# Patient Record
Sex: MALE | Race: WHITE
[De-identification: names, ages, dates, MRNs, and addresses within clinical notes are randomized per-mention and may not be internally consistent; named-entity substitution may affect disease eponyms.]

---

## 2022-06-27 ENCOUNTER — APPOINTMENT (OUTPATIENT)
Dept: FAMILY MEDICINE | Facility: CLINIC | Age: 22
End: 2022-06-27

## 2022-11-28 ENCOUNTER — APPOINTMENT (OUTPATIENT)
Dept: FAMILY MEDICINE | Facility: CLINIC | Age: 22
End: 2022-11-28

## 2022-11-28 VITALS
DIASTOLIC BLOOD PRESSURE: 78 MMHG | BODY MASS INDEX: 22.93 KG/M2 | SYSTOLIC BLOOD PRESSURE: 122 MMHG | HEIGHT: 73 IN | HEART RATE: 64 BPM | OXYGEN SATURATION: 98 % | WEIGHT: 173 LBS | TEMPERATURE: 97.4 F

## 2022-11-28 DIAGNOSIS — Z91.09 OTHER ALLERGY STATUS, OTHER THAN TO DRUGS AND BIOLOGICAL SUBSTANCES: ICD-10-CM

## 2022-11-28 DIAGNOSIS — Z80.0 FAMILY HISTORY OF MALIGNANT NEOPLASM OF DIGESTIVE ORGANS: ICD-10-CM

## 2022-11-28 DIAGNOSIS — Z23 ENCOUNTER FOR IMMUNIZATION: ICD-10-CM

## 2022-11-28 DIAGNOSIS — Z78.9 OTHER SPECIFIED HEALTH STATUS: ICD-10-CM

## 2022-11-28 DIAGNOSIS — Z83.3 FAMILY HISTORY OF DIABETES MELLITUS: ICD-10-CM

## 2022-11-28 DIAGNOSIS — Z82.0 FAMILY HISTORY OF EPILEPSY AND OTHER DISEASES OF THE NERVOUS SYSTEM: ICD-10-CM

## 2022-11-28 DIAGNOSIS — Z80.8 FAMILY HISTORY OF MALIGNANT NEOPLASM OF OTHER ORGANS OR SYSTEMS: ICD-10-CM

## 2022-11-28 PROCEDURE — 90471 IMMUNIZATION ADMIN: CPT

## 2022-11-28 PROCEDURE — 36415 COLL VENOUS BLD VENIPUNCTURE: CPT

## 2022-11-28 PROCEDURE — 99385 PREV VISIT NEW AGE 18-39: CPT | Mod: 25

## 2022-11-28 PROCEDURE — 90649 4VHPV VACCINE 3 DOSE IM: CPT

## 2022-11-28 RX ORDER — ALBUTEROL SULFATE 90 UG/1
108 (90 BASE) INHALANT RESPIRATORY (INHALATION)
Qty: 7 | Refills: 0 | Status: ACTIVE | COMMUNITY
Start: 2022-11-02

## 2022-11-28 RX ORDER — CETIRIZINE HCL 10 MG
10 TABLET ORAL
Refills: 0 | Status: ACTIVE | COMMUNITY

## 2022-11-28 NOTE — ASSESSMENT
[FreeTextEntry1] : Patient is a 21yo male presenting to the office for CPE.  Pt is new to our practice.\par \par Health Maintenance\par - Due for HPV, dose #1 given today.  R/B/SE/A discussed.\par - Due for flu shot, pt declines today.\par - Due for skin check, dermatology referral provided.\par - Unsure of Tdap status, tasked team to obtain immunization record from previous provider, Dr. Phipps.\par - Fasting labs drawn in office.\par - Eat plenty of fruits and vegetables, especially deeply colored fruits/vegetables (such as leafy greens, peaches) that are more nutrient-dense.  Continue to work hard on diet and exercise, limiting/avoiding saturated fat, fatty foods, greasy foods, red meats, white flour-based carbohydrates (cookies, cakes, white bread, white rice), and added sugars.  Chose whole grain foods and products made with whole grains over refined grains and white flour-based carbohydrates.  Avoid beverages and food with added sugar.  Limit salt intake to improve blood pressure.  Limit alcohol intake.\par - Try and incorporate 30 minutes of aerobic exercise to your daily routine.\par \par Call the office or go to the ED immediately if you develop new, worsening or concerning symptoms including high fever, severe headache/worst headache of your life, confusion, dizziness/lightheadedness, loss of consciousness, severe chest pain, difficulty breathing, shortness of breath, severe abdominal pain, excessive vomiting/diarrhea, inability to feel/move the extremities, or any other concerning symptoms.\par

## 2022-11-28 NOTE — HISTORY OF PRESENT ILLNESS
[FreeTextEntry1] : CPE, establish care. [de-identified] : Patient is a 21yo male presenting to the office for CPE.  Pt is new to our practice.\par \par Last CPE:  unsure of last, over 1 year ago; Dr. Phipps, previously [facility].\par Ophthalmology:  does not wear corrective lenses.\par Dermatology:  due, recommended today.\par Dentist:  UTD.\par Flu:  due, declines today.\par Tdap:  unsure of last, will obtain records.\par COVID:  x3.\par \par Pt is currently seeing an allergist for environmental allergies.  Has been using Flonase with some improvement but is still suffering from allergy symptoms.  Also uses Albuterol PRN.  Has follow up scheduled this week.\par \par Pt has never had HPV vaccination and is interested in getting it today.

## 2022-11-28 NOTE — HEALTH RISK ASSESSMENT
[Good] : ~his/her~  mood as  good [Former] : Former [Yes] : Yes [2 - 3 times a week (3 pts)] : 2 - 3  times a week (3 points) [1 or 2 (0 pts)] : 1 or 2 (0 points) [Never (0 pts)] : Never (0 points) [No] : In the past 12 months have you used drugs other than those required for medical reasons? No [0] : 2) Feeling down, depressed, or hopeless: Not at all (0) [PHQ-2 Negative - No further assessment needed] : PHQ-2 Negative - No further assessment needed [HIV test declined] : HIV test declined [Hepatitis C test declined] : Hepatitis C test declined [None] : None [Alone] : lives alone [Employed] : employed [College] : College [Single] : single [Sexually Active] : sexually active [Feels Safe at Home] : Feels safe at home [Fully functional (bathing, dressing, toileting, transferring, walking, feeding)] : Fully functional (bathing, dressing, toileting, transferring, walking, feeding) [Fully functional (using the telephone, shopping, preparing meals, housekeeping, doing laundry, using] : Fully functional and needs no help or supervision to perform IADLs (using the telephone, shopping, preparing meals, housekeeping, doing laundry, using transportation, managing medications and managing finances) [Smoke Detector] : smoke detector [Carbon Monoxide Detector] : carbon monoxide detector [Safety elements used in home] : safety elements used in home [Seat Belt] :  uses seat belt [Sunscreen] : uses sunscreen [Patient/Caregiver not ready to engage] : , patient/caregiver not ready to engage [Audit-CScore] : 3 [de-identified] : marijuana infrequently [de-identified] : 1-2 times per week, lifts weights [de-identified] : balanced, room for improvement. 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

## 2022-11-29 LAB
ALBUMIN SERPL ELPH-MCNC: 4.6 G/DL
ALP BLD-CCNC: 158 U/L
ALT SERPL-CCNC: 30 U/L
ANION GAP SERPL CALC-SCNC: 14 MMOL/L
AST SERPL-CCNC: 20 U/L
BASOPHILS # BLD AUTO: 0.04 K/UL
BASOPHILS NFR BLD AUTO: 0.6 %
BILIRUB SERPL-MCNC: 0.5 MG/DL
BUN SERPL-MCNC: 18 MG/DL
CALCIUM SERPL-MCNC: 9.7 MG/DL
CHLORIDE SERPL-SCNC: 105 MMOL/L
CHOLEST SERPL-MCNC: 177 MG/DL
CO2 SERPL-SCNC: 23 MMOL/L
CREAT SERPL-MCNC: 1.17 MG/DL
EGFR: 90 ML/MIN/1.73M2
EOSINOPHIL # BLD AUTO: 0.09 K/UL
EOSINOPHIL NFR BLD AUTO: 1.3 %
GLUCOSE SERPL-MCNC: 84 MG/DL
HCT VFR BLD CALC: 45.6 %
HDLC SERPL-MCNC: 54 MG/DL
HGB BLD-MCNC: 15.6 G/DL
IMM GRANULOCYTES NFR BLD AUTO: 0.3 %
LDLC SERPL CALC-MCNC: 106 MG/DL
LYMPHOCYTES # BLD AUTO: 2.82 K/UL
LYMPHOCYTES NFR BLD AUTO: 41.3 %
MAN DIFF?: NORMAL
MCHC RBC-ENTMCNC: 29.8 PG
MCHC RBC-ENTMCNC: 34.2 GM/DL
MCV RBC AUTO: 87 FL
MONOCYTES # BLD AUTO: 0.52 K/UL
MONOCYTES NFR BLD AUTO: 7.6 %
NEUTROPHILS # BLD AUTO: 3.33 K/UL
NEUTROPHILS NFR BLD AUTO: 48.9 %
NONHDLC SERPL-MCNC: 123 MG/DL
PLATELET # BLD AUTO: 230 K/UL
POTASSIUM SERPL-SCNC: 4.8 MMOL/L
PROT SERPL-MCNC: 7.1 G/DL
RBC # BLD: 5.24 M/UL
RBC # FLD: 11.8 %
SODIUM SERPL-SCNC: 141 MMOL/L
TRIGL SERPL-MCNC: 82 MG/DL
TSH SERPL-ACNC: 2.05 UIU/ML
WBC # FLD AUTO: 6.82 K/UL

## 2022-11-30 ENCOUNTER — NON-APPOINTMENT (OUTPATIENT)
Age: 22
End: 2022-11-30

## 2023-01-27 ENCOUNTER — APPOINTMENT (OUTPATIENT)
Dept: FAMILY MEDICINE | Facility: CLINIC | Age: 23
End: 2023-01-27

## 2023-02-02 ENCOUNTER — MED ADMIN CHARGE (OUTPATIENT)
Age: 23
End: 2023-02-02

## 2023-02-02 ENCOUNTER — APPOINTMENT (OUTPATIENT)
Dept: FAMILY MEDICINE | Facility: CLINIC | Age: 23
End: 2023-02-02
Payer: COMMERCIAL

## 2023-02-02 PROCEDURE — 90471 IMMUNIZATION ADMIN: CPT

## 2023-02-02 PROCEDURE — 90651 9VHPV VACCINE 2/3 DOSE IM: CPT

## 2023-02-03 ENCOUNTER — APPOINTMENT (OUTPATIENT)
Dept: FAMILY MEDICINE | Facility: CLINIC | Age: 23
End: 2023-02-03

## 2023-06-22 ENCOUNTER — RESULT REVIEW (OUTPATIENT)
Age: 23
End: 2023-06-22

## 2023-06-22 ENCOUNTER — OUTPATIENT (OUTPATIENT)
Dept: OUTPATIENT SERVICES | Facility: HOSPITAL | Age: 23
LOS: 1 days | End: 2023-06-22
Payer: COMMERCIAL

## 2023-06-22 ENCOUNTER — APPOINTMENT (OUTPATIENT)
Dept: RADIOLOGY | Facility: CLINIC | Age: 23
End: 2023-06-22

## 2023-06-22 PROCEDURE — 73564 X-RAY EXAM KNEE 4 OR MORE: CPT | Mod: 26,LT

## 2023-08-07 ENCOUNTER — MED ADMIN CHARGE (OUTPATIENT)
Age: 23
End: 2023-08-07

## 2023-08-07 ENCOUNTER — APPOINTMENT (OUTPATIENT)
Dept: FAMILY MEDICINE | Facility: CLINIC | Age: 23
End: 2023-08-07
Payer: COMMERCIAL

## 2023-08-07 PROCEDURE — 90651 9VHPV VACCINE 2/3 DOSE IM: CPT

## 2023-08-07 PROCEDURE — 90471 IMMUNIZATION ADMIN: CPT

## 2023-12-01 ENCOUNTER — APPOINTMENT (OUTPATIENT)
Dept: FAMILY MEDICINE | Facility: CLINIC | Age: 23
End: 2023-12-01
Payer: COMMERCIAL

## 2023-12-01 ENCOUNTER — NON-APPOINTMENT (OUTPATIENT)
Age: 23
End: 2023-12-01

## 2023-12-01 VITALS
TEMPERATURE: 97.3 F | OXYGEN SATURATION: 99 % | SYSTOLIC BLOOD PRESSURE: 100 MMHG | DIASTOLIC BLOOD PRESSURE: 60 MMHG | BODY MASS INDEX: 21.34 KG/M2 | WEIGHT: 161 LBS | HEIGHT: 73 IN | HEART RATE: 61 BPM

## 2023-12-01 DIAGNOSIS — J34.2 DEVIATED NASAL SEPTUM: ICD-10-CM

## 2023-12-01 DIAGNOSIS — Z01.818 ENCOUNTER FOR OTHER PREPROCEDURAL EXAMINATION: ICD-10-CM

## 2023-12-01 DIAGNOSIS — Z00.00 ENCOUNTER FOR GENERAL ADULT MEDICAL EXAMINATION W/OUT ABNORMAL FINDINGS: ICD-10-CM

## 2023-12-01 PROCEDURE — 90686 IIV4 VACC NO PRSV 0.5 ML IM: CPT

## 2023-12-01 PROCEDURE — 90472 IMMUNIZATION ADMIN EACH ADD: CPT

## 2023-12-01 PROCEDURE — 90715 TDAP VACCINE 7 YRS/> IM: CPT

## 2023-12-01 PROCEDURE — G0008: CPT

## 2023-12-01 PROCEDURE — 36415 COLL VENOUS BLD VENIPUNCTURE: CPT

## 2023-12-01 PROCEDURE — 99395 PREV VISIT EST AGE 18-39: CPT | Mod: 25

## 2023-12-01 PROCEDURE — 93000 ELECTROCARDIOGRAM COMPLETE: CPT | Mod: 59

## 2023-12-03 LAB
ALBUMIN SERPL ELPH-MCNC: 5 G/DL
ALP BLD-CCNC: 115 U/L
ALT SERPL-CCNC: 14 U/L
ANION GAP SERPL CALC-SCNC: 18 MMOL/L
APTT BLD: 40 SEC
AST SERPL-CCNC: 16 U/L
BASOPHILS # BLD AUTO: 0.05 K/UL
BASOPHILS NFR BLD AUTO: 0.5 %
BILIRUB SERPL-MCNC: 0.9 MG/DL
BUN SERPL-MCNC: 21 MG/DL
CALCIUM SERPL-MCNC: 9.7 MG/DL
CHLORIDE SERPL-SCNC: 105 MMOL/L
CHOLEST SERPL-MCNC: 167 MG/DL
CO2 SERPL-SCNC: 18 MMOL/L
CREAT SERPL-MCNC: 1.15 MG/DL
EGFR: 92 ML/MIN/1.73M2
EOSINOPHIL # BLD AUTO: 0.06 K/UL
EOSINOPHIL NFR BLD AUTO: 0.6 %
GLUCOSE SERPL-MCNC: 77 MG/DL
HCT VFR BLD CALC: 46.9 %
HDLC SERPL-MCNC: 50 MG/DL
HGB BLD-MCNC: 15.7 G/DL
IMM GRANULOCYTES NFR BLD AUTO: 0.3 %
INR PPP: 0.95 RATIO
LDLC SERPL CALC-MCNC: 107 MG/DL
LYMPHOCYTES # BLD AUTO: 2.41 K/UL
LYMPHOCYTES NFR BLD AUTO: 25.7 %
MAN DIFF?: NORMAL
MCHC RBC-ENTMCNC: 29.3 PG
MCHC RBC-ENTMCNC: 33.5 GM/DL
MCV RBC AUTO: 87.5 FL
MONOCYTES # BLD AUTO: 0.58 K/UL
MONOCYTES NFR BLD AUTO: 6.2 %
NEUTROPHILS # BLD AUTO: 6.24 K/UL
NEUTROPHILS NFR BLD AUTO: 66.7 %
NONHDLC SERPL-MCNC: 118 MG/DL
PLATELET # BLD AUTO: 225 K/UL
POTASSIUM SERPL-SCNC: 4.9 MMOL/L
PROT SERPL-MCNC: 6.9 G/DL
PT BLD: 10.8 SEC
RBC # BLD: 5.36 M/UL
RBC # FLD: 12.2 %
SODIUM SERPL-SCNC: 141 MMOL/L
TRIGL SERPL-MCNC: 53 MG/DL
WBC # FLD AUTO: 9.37 K/UL

## 2025-01-03 ENCOUNTER — APPOINTMENT (OUTPATIENT)
Dept: FAMILY MEDICINE | Facility: CLINIC | Age: 25
End: 2025-01-03
Payer: COMMERCIAL

## 2025-01-03 VITALS
BODY MASS INDEX: 20.94 KG/M2 | WEIGHT: 158 LBS | SYSTOLIC BLOOD PRESSURE: 112 MMHG | HEIGHT: 73 IN | HEART RATE: 72 BPM | DIASTOLIC BLOOD PRESSURE: 72 MMHG | OXYGEN SATURATION: 97 % | TEMPERATURE: 97.4 F

## 2025-01-03 DIAGNOSIS — J32.9 CHRONIC SINUSITIS, UNSPECIFIED: ICD-10-CM

## 2025-01-03 DIAGNOSIS — Z00.00 ENCOUNTER FOR GENERAL ADULT MEDICAL EXAMINATION W/OUT ABNORMAL FINDINGS: ICD-10-CM

## 2025-01-03 PROCEDURE — 99395 PREV VISIT EST AGE 18-39: CPT

## 2025-01-03 PROCEDURE — 36415 COLL VENOUS BLD VENIPUNCTURE: CPT

## 2025-01-06 LAB
25(OH)D3 SERPL-MCNC: 26.5 NG/ML
ALBUMIN SERPL ELPH-MCNC: 4.9 G/DL
ALP BLD-CCNC: 100 U/L
ALT SERPL-CCNC: 38 U/L
ANION GAP SERPL CALC-SCNC: 15 MMOL/L
AST SERPL-CCNC: 20 U/L
BASOPHILS # BLD AUTO: 0.04 K/UL
BASOPHILS NFR BLD AUTO: 0.7 %
BILIRUB SERPL-MCNC: 0.7 MG/DL
BUN SERPL-MCNC: 17 MG/DL
CALCIUM SERPL-MCNC: 9.6 MG/DL
CHLORIDE SERPL-SCNC: 107 MMOL/L
CHOLEST SERPL-MCNC: 190 MG/DL
CO2 SERPL-SCNC: 21 MMOL/L
CREAT SERPL-MCNC: 0.96 MG/DL
EGFR: 113 ML/MIN/1.73M2
EOSINOPHIL # BLD AUTO: 0.06 K/UL
EOSINOPHIL NFR BLD AUTO: 1 %
GLUCOSE SERPL-MCNC: 84 MG/DL
HCT VFR BLD CALC: 45.8 %
HDLC SERPL-MCNC: 55 MG/DL
HGB BLD-MCNC: 15.8 G/DL
IMM GRANULOCYTES NFR BLD AUTO: 0.3 %
LDLC SERPL CALC-MCNC: 123 MG/DL
LYMPHOCYTES # BLD AUTO: 2.2 K/UL
LYMPHOCYTES NFR BLD AUTO: 36.7 %
MAN DIFF?: NORMAL
MCHC RBC-ENTMCNC: 29.6 PG
MCHC RBC-ENTMCNC: 34.5 G/DL
MCV RBC AUTO: 85.8 FL
MONOCYTES # BLD AUTO: 0.44 K/UL
MONOCYTES NFR BLD AUTO: 7.3 %
NEUTROPHILS # BLD AUTO: 3.23 K/UL
NEUTROPHILS NFR BLD AUTO: 54 %
NONHDLC SERPL-MCNC: 135 MG/DL
PLATELET # BLD AUTO: 237 K/UL
POTASSIUM SERPL-SCNC: 4.8 MMOL/L
PROT SERPL-MCNC: 7.2 G/DL
RBC # BLD: 5.34 M/UL
RBC # FLD: 12.2 %
SODIUM SERPL-SCNC: 143 MMOL/L
TRIGL SERPL-MCNC: 61 MG/DL
TSH SERPL-ACNC: 1.43 UIU/ML
WBC # FLD AUTO: 5.99 K/UL